# Patient Record
Sex: FEMALE | Race: WHITE
[De-identification: names, ages, dates, MRNs, and addresses within clinical notes are randomized per-mention and may not be internally consistent; named-entity substitution may affect disease eponyms.]

---

## 2017-01-18 ENCOUNTER — HOSPITAL ENCOUNTER (EMERGENCY)
Dept: HOSPITAL 62 - ER | Age: 27
Discharge: HOME | End: 2017-01-18
Payer: OTHER GOVERNMENT

## 2017-01-18 VITALS — DIASTOLIC BLOOD PRESSURE: 78 MMHG | SYSTOLIC BLOOD PRESSURE: 121 MMHG

## 2017-01-18 DIAGNOSIS — R20.0: ICD-10-CM

## 2017-01-18 DIAGNOSIS — G51.0: Primary | ICD-10-CM

## 2017-01-18 DIAGNOSIS — R29.810: ICD-10-CM

## 2017-01-18 PROCEDURE — 70450 CT HEAD/BRAIN W/O DYE: CPT

## 2017-01-18 PROCEDURE — 99284 EMERGENCY DEPT VISIT MOD MDM: CPT

## 2017-01-18 NOTE — ER DOCUMENT REPORT
ED Medical Screen (RME)





- General


Stated Complaint: RIGHT SIDE FACIAL NUMBNESS/DROOP


Mode of Arrival: Ambulatory


Information source: Patient


Notes: 


Patient presents to the emergency department with right-sided facial droop, 

numbness.  Patient recently had vaginal births 2 weeks ago.  She reports eye 

irritation started last night,, numbness two hours ago,  facial droop started 

one hour ago.  Denies shortness of breath chest pain.








I have greeted and performed a rapid initial assessment of this patient.  A 

comprehensive ED assessment and evaluation of the patient, analysis of test 

results and completion of the medical decision making process will be conducted 

by additional ED providers.


TRAVEL OUTSIDE OF THE [place] IN LAST 30 DAYS: No





- Related Data


Allergies/Adverse Reactions: 


 





latex [Latex] Allergy (Verified 01/18/17 18:19)


 











Past Medical History


Past Surgical History: Reports: Hx Orthopedic Surgery - r Knee





- Immunizations


Hx Diphtheria, Pertussis, Tetanus Vaccination: Yes

## 2017-01-18 NOTE — ER DOCUMENT REPORT
ED General





- General


Chief Complaint: Facial Droop


Stated Complaint: RIGHT SIDE FACIAL NUMBNESS/DROOP


Mode of Arrival: Ambulatory


Notes: 


Patient is a 26-year-old female without past medical history, 2 weeks 

postpartum from a spontaneous vaginal delivery who presents with 2 hours of left

-sided facial droop.  Patient's symptoms started abruptly and have been 

unchanged since onset.  Nothing improves or worsens her symptoms.  She denies a 

history of similar symptoms in the past.  Denies any additional weakness or 

numbness in any other portion of her body.  No changes in her vision.  Notes 

that her left eye is unable to close completely.  She is not spoke with her 

primary care physician regarding today's concerned.


TRAVEL OUTSIDE OF THE [place] IN LAST 30 DAYS: No





- Related Data


Allergies/Adverse Reactions: 


 





latex [Latex] Allergy (Verified 01/18/17 18:19)


 











Past Medical History





- General


Information source: Patient





- Social History


Smoking Status: Never Smoker


Chew tobacco use (# tins/day): No


Frequency of alcohol use: None


Drug Abuse: None


Lives with: Spouse/Significant other


Family History: Reviewed & Not Pertinent


Patient has suicidal ideation: No


Patient has homicidal ideation: No


Renal/ Medical History: Denies: Hx Peritoneal Dialysis


Past Surgical History: Reports: Hx Orthopedic Surgery - r Knee





- Immunizations


Hx Diphtheria, Pertussis, Tetanus Vaccination: Yes


Hx Pneumococcal Vaccination: 10/01/14





Review of Systems





- Review of Systems


Notes: 


Constitutional: Negative for fever.


HENT: Negative for sore throat.


Eyes: Negative for visual changes.


Cardiovascular: Negative for chest pain.


Respiratory: Negative for shortness of breath.


Gastrointestinal: Negative for abdominal pain, vomiting or diarrhea.


Genitourinary: Negative for dysuria.


Musculoskeletal: Negative for back pain.


Skin: Negative for rash.


Neurological: Negative for headaches, positive for left facial droop





10 point ROS negative except as marked above and in HPI.





Physical Exam





- Vital signs


Vitals: 


 











Temp Pulse Resp BP Pulse Ox


 


 99.6 F   92   16   129/83 H  99 


 


 01/18/17 18:23  01/18/17 18:23  01/18/17 18:23  01/18/17 18:23  01/18/17 18:23











Interpretation: Normal


Notes: 


PHYSICAL EXAMINATION:





GENERAL: Well-appearing, well-nourished and in no acute distress.





HEAD: Atraumatic, normocephalic.





EYES: Pupils equal round and reactive to light, extraocular movements intact, 

sclera anicteric, conjunctiva are normal.





ENT: nares patent, oropharynx clear without exudates.  Moist mucous membranes.





NECK: Normal range of motion, supple without lymphadenopathy





LUNGS: Breath sounds clear to auscultation bilaterally and equal.  No wheezes 

rales or rhonchi.





HEART: Regular rate and rhythm without murmurs





ABDOMEN: Soft, nontender, normoactive bowel sounds.  No guarding, no rebound.  

No masses appreciated.





EXTREMITIES: Normal range of motion, no pitting or edema.  No cyanosis.





NEUROLOGICAL: Slight left facial droop with effacement of the left forehead.  

Sensation intact bilaterally in the face.  Tongue protrudes midline.  

Extraocular motions intact.  Pupils are 2 mm and equally reactive.  Normal 

speech, normal gait.  5 out of 5 strength in both the distal and proximal upper 

and lower extremities bilaterally.  Sensation is grossly intact throughout.  

Finger to nose testing normal.  Pronator drift normal.





PSYCH: Normal mood, normal affect.





SKIN: Warm, Dry, normal turgor, no rashes or lesions noted.





Course





- Re-evaluation


Re-evalutation: 





01/19/17 03:14


Presentation is most consistent with a Bell's palsy on the left side.  Patient 

has complete involvement including of the forehead.  No additional focal 

neurologic deficits.  Presentation and exam are not consistent with an acute 

stroke.  Patient has been started on prednisone and valacyclovir.  An eye patch 

has been provided.  At this time will discharge with return precautions and 

follow-up recommendations.  Verbal discharge instructions given a the bedside 

and opportunity for questions given. Medication warnings reviewed. Patient is 

in agreement with this plan and has verbalized understanding of return 

precautions and the need for primary care follow-up in the next 24-72 hours.





- Vital Signs


Vital signs: 


 











Temp Pulse Resp BP Pulse Ox


 


 98.4 F   96   17   121/78   97 


 


 01/18/17 20:16  01/18/17 20:16  01/18/17 20:16  01/18/17 20:16  01/18/17 20:16














- Diagnostic Test


Radiology reviewed: Reports reviewed





Discharge





- Discharge


Clinical Impression: 


 Bell's palsy





Condition: Good


Disposition: HOME, SELF-CARE


Additional Instructions: 


You have been diagnosed with a condition called Bell's palsy.  You have not had 

a stroke.  This is inflammation of your facial nerve and should improve over 

the next several months.  Very few cases progress to being permanent.  Please 

take the steroids and antiviral medications that have been prescribed as 

directed.  Complete the course.  Please follow-up with your primary care 

physician in the next several days.  Return if you develop spreading weakness, 

numbness, confusion, headache, neck pain, fever greater than 101F, or any 

other symptoms that are concerning to you.


Prescriptions: 


Prednisone 40 mg PO DAILY #12 tablet


Valacyclovir HCl [Valacyclovir] 1,000 mg PO Q12H #14 tablet

## 2017-11-13 ENCOUNTER — HOSPITAL ENCOUNTER (OUTPATIENT)
Dept: HOSPITAL 62 - END | Age: 27
Discharge: HOME | End: 2017-11-13
Attending: INTERNAL MEDICINE
Payer: OTHER GOVERNMENT

## 2017-11-13 VITALS — DIASTOLIC BLOOD PRESSURE: 62 MMHG | SYSTOLIC BLOOD PRESSURE: 100 MMHG

## 2017-11-13 DIAGNOSIS — K64.8: ICD-10-CM

## 2017-11-13 DIAGNOSIS — K29.50: ICD-10-CM

## 2017-11-13 DIAGNOSIS — D12.4: Primary | ICD-10-CM

## 2017-11-13 DIAGNOSIS — Z80.0: ICD-10-CM

## 2017-11-13 DIAGNOSIS — K44.9: ICD-10-CM

## 2017-11-13 DIAGNOSIS — K92.1: ICD-10-CM

## 2017-11-13 PROCEDURE — 0DBM8ZX EXCISION OF DESCENDING COLON, VIA NATURAL OR ARTIFICIAL OPENING ENDOSCOPIC, DIAGNOSTIC: ICD-10-PCS | Performed by: INTERNAL MEDICINE

## 2017-11-13 PROCEDURE — 45380 COLONOSCOPY AND BIOPSY: CPT

## 2017-11-13 PROCEDURE — 88342 IMHCHEM/IMCYTCHM 1ST ANTB: CPT

## 2017-11-13 PROCEDURE — 43239 EGD BIOPSY SINGLE/MULTIPLE: CPT

## 2017-11-13 PROCEDURE — 0DB68ZX EXCISION OF STOMACH, VIA NATURAL OR ARTIFICIAL OPENING ENDOSCOPIC, DIAGNOSTIC: ICD-10-PCS | Performed by: INTERNAL MEDICINE

## 2017-11-13 PROCEDURE — 88305 TISSUE EXAM BY PATHOLOGIST: CPT

## 2017-11-13 NOTE — OPERATIVE REPORT
Operative Report


DATE OF SURGERY: 11/13/17


Operative Report: 





The risks, benefits and alternatives of the procedure including risks of 

bleeding, perforation requiring surgery are explained to the patient in detail 

and informed consent is obtained.  Patient was taken back to the endoscopy 

suite and placed in the left, lateral decubital position.  Timeout was called.  

Propofol medications administered.  A rectal examination was done which did not 

reveal any masses, tears or fissures.  An Olympus videoscope was inserted into 

the patient's rectum.  The scope was then carefully advanced all the way to the 

cecum.  The cecum was identified by the usual anatomical landmarks including 

the ileocecal valve as well as the appendiceal office.  Photodocumentation is 

obtained.  The scope was then sequentially pulled back via the various segments 

of the colon including the ascending colon, hepatic flexure, transverse colon, 

splenic flexure, descending colon and finding to the rectosigmoid portions of 

the colon.  Retroflexion maneuvers performed.  Intubation of the terminal ileum 

is done.


The risks benefits and alternatives of the procedure explained to the patient 

in detail and informed consent is obtained.A GIF Olympus video scope was 

inserted into the patient's mouth and hypopharynx, the esophagus is identified 

intubated and insufflated, the scope was then advanced through the esophagus 

stomach and duodenum, retroflexion maneuver is done ,the esophagus stomach and 

first and second portions of the duodenum examined


PREOPERATIVE DIAGNOSIS: Epigastric pain rule out Helicobacter pylori.  

Screening colonoscopy secondary to a family history of colorectal cancer


POSTOPERATIVE DIAGNOSIS: Small polyp was removed via biopsy forceps.  Internal 

hemorrhoids.  Gastritis status post biopsy rule out Helicobacter pylori.  Small 

hiatal hernia


OPERATION: Colonoscopy with biopsy.  EGD with biopsy


SURGEON: SHIMON LOPES


ANESTHESIA: LMAC


TISSUE REMOVED OR ALTERED: As noted above.


COMPLICATIONS: 





None.


ESTIMATED BLOOD LOSS: None.


INTRAOPERATIVE FINDINGS: As noted above.


PROCEDURE: 





Patient tolerated procedure well.


No immediate postprocedure complications are noted.


Patient discharged in good condition.


Discharge date 11/13/2017.


Discharge diet: Regular.


Discharge activity: Regular.


2-3 week follow-up to discuss findings.


Patient is instructed to call the office or proceed to the emergency room 

should there be any further problems or questions.


5 year surveillance colonoscopy.


We will await pathology.

## 2019-06-06 ENCOUNTER — HOSPITAL ENCOUNTER (EMERGENCY)
Dept: HOSPITAL 62 - ER | Age: 29
Discharge: HOME | End: 2019-06-06
Payer: OTHER GOVERNMENT

## 2019-06-06 VITALS — SYSTOLIC BLOOD PRESSURE: 133 MMHG | DIASTOLIC BLOOD PRESSURE: 78 MMHG

## 2019-06-06 DIAGNOSIS — R11.0: ICD-10-CM

## 2019-06-06 DIAGNOSIS — R42: ICD-10-CM

## 2019-06-06 DIAGNOSIS — R00.2: Primary | ICD-10-CM

## 2019-06-06 DIAGNOSIS — R07.9: ICD-10-CM

## 2019-06-06 DIAGNOSIS — Z91.040: ICD-10-CM

## 2019-06-06 LAB
ADD MANUAL DIFF: NO
ALBUMIN SERPL-MCNC: 4.9 G/DL (ref 3.5–5)
ALP SERPL-CCNC: 56 U/L (ref 38–126)
ALT SERPL-CCNC: 20 U/L (ref 9–52)
ANION GAP SERPL CALC-SCNC: 13 MMOL/L (ref 5–19)
APPEARANCE UR: CLEAR
APTT BLD: 26.9 SEC (ref 23.5–35.8)
APTT PPP: (no result) S
AST SERPL-CCNC: 23 U/L (ref 14–36)
BASOPHILS # BLD AUTO: 0.1 10^3/UL (ref 0–0.2)
BASOPHILS NFR BLD AUTO: 0.7 % (ref 0–2)
BILIRUB DIRECT SERPL-MCNC: 0.2 MG/DL (ref 0–0.4)
BILIRUB SERPL-MCNC: 0.5 MG/DL (ref 0.2–1.3)
BILIRUB UR QL STRIP: NEGATIVE
BUN SERPL-MCNC: 7 MG/DL (ref 7–20)
CALCIUM: 9.4 MG/DL (ref 8.4–10.2)
CHLORIDE SERPL-SCNC: 106 MMOL/L (ref 98–107)
CO2 SERPL-SCNC: 24 MMOL/L (ref 22–30)
EOSINOPHIL # BLD AUTO: 0.1 10^3/UL (ref 0–0.6)
EOSINOPHIL NFR BLD AUTO: 0.7 % (ref 0–6)
ERYTHROCYTE [DISTWIDTH] IN BLOOD BY AUTOMATED COUNT: 14 % (ref 11.5–14)
GLUCOSE SERPL-MCNC: 107 MG/DL (ref 75–110)
GLUCOSE UR STRIP-MCNC: NEGATIVE MG/DL
HCT VFR BLD CALC: 41.4 % (ref 36–47)
HGB BLD-MCNC: 13.7 G/DL (ref 12–15.5)
INR PPP: 1.02
KETONES UR STRIP-MCNC: NEGATIVE MG/DL
LYMPHOCYTES # BLD AUTO: 2.7 10^3/UL (ref 0.5–4.7)
LYMPHOCYTES NFR BLD AUTO: 28.4 % (ref 13–45)
MCH RBC QN AUTO: 26.9 PG (ref 27–33.4)
MCHC RBC AUTO-ENTMCNC: 33.1 G/DL (ref 32–36)
MCV RBC AUTO: 81 FL (ref 80–97)
MONOCYTES # BLD AUTO: 0.5 10^3/UL (ref 0.1–1.4)
MONOCYTES NFR BLD AUTO: 5.6 % (ref 3–13)
NEUTROPHILS # BLD AUTO: 6.1 10^3/UL (ref 1.7–8.2)
NEUTS SEG NFR BLD AUTO: 64.6 % (ref 42–78)
NITRITE UR QL STRIP: NEGATIVE
PH UR STRIP: 8 [PH] (ref 5–9)
PLATELET # BLD: 206 10^3/UL (ref 150–450)
POTASSIUM SERPL-SCNC: 3.8 MMOL/L (ref 3.6–5)
PROT SERPL-MCNC: 8.1 G/DL (ref 6.3–8.2)
PROT UR STRIP-MCNC: NEGATIVE MG/DL
PROTHROMBIN TIME: 13.9 SEC (ref 11.4–15.4)
RBC # BLD AUTO: 5.09 10^6/UL (ref 3.72–5.28)
SODIUM SERPL-SCNC: 142.9 MMOL/L (ref 137–145)
SP GR UR STRIP: 1.01
TOTAL CELLS COUNTED % (AUTO): 100 %
UROBILINOGEN UR-MCNC: NEGATIVE MG/DL (ref ?–2)
WBC # BLD AUTO: 9.4 10^3/UL (ref 4–10.5)

## 2019-06-06 PROCEDURE — 85610 PROTHROMBIN TIME: CPT

## 2019-06-06 PROCEDURE — 85730 THROMBOPLASTIN TIME PARTIAL: CPT

## 2019-06-06 PROCEDURE — 93010 ELECTROCARDIOGRAM REPORT: CPT

## 2019-06-06 PROCEDURE — S0119 ONDANSETRON 4 MG: HCPCS

## 2019-06-06 PROCEDURE — 93005 ELECTROCARDIOGRAM TRACING: CPT

## 2019-06-06 PROCEDURE — 71045 X-RAY EXAM CHEST 1 VIEW: CPT

## 2019-06-06 PROCEDURE — 85025 COMPLETE CBC W/AUTO DIFF WBC: CPT

## 2019-06-06 PROCEDURE — 36415 COLL VENOUS BLD VENIPUNCTURE: CPT

## 2019-06-06 PROCEDURE — 99285 EMERGENCY DEPT VISIT HI MDM: CPT

## 2019-06-06 PROCEDURE — 81001 URINALYSIS AUTO W/SCOPE: CPT

## 2019-06-06 PROCEDURE — 87086 URINE CULTURE/COLONY COUNT: CPT

## 2019-06-06 PROCEDURE — 80053 COMPREHEN METABOLIC PANEL: CPT

## 2019-06-06 PROCEDURE — 84484 ASSAY OF TROPONIN QUANT: CPT

## 2019-06-06 PROCEDURE — 84703 CHORIONIC GONADOTROPIN ASSAY: CPT

## 2019-06-06 NOTE — RADIOLOGY REPORT (SQ)
EXAM DESCRIPTION:  CHEST SINGLE VIEW



COMPLETED DATE/TIME:  6/6/2019 2:05 pm



REASON FOR STUDY:  chest pain



COMPARISON:  None.



EXAM PARAMETERS:  NUMBER OF VIEWS: One view.

TECHNIQUE: Single frontal radiographic view of the chest acquired.

RADIATION DOSE: NA

LIMITATIONS: None.



FINDINGS:  LUNGS AND PLEURA: No opacities, masses or pneumothorax. No pleural effusion.

MEDIASTINUM AND HILAR STRUCTURES: No masses.  Contour normal.

HEART AND VASCULAR STRUCTURES: Heart normal in size.  Normal vasculature.

BONES: No acute findings.

HARDWARE: None in the chest.

OTHER: No other significant finding.



IMPRESSION:  NO ACUTE RADIOGRAPHIC FINDING IN THE CHEST.



TECHNICAL DOCUMENTATION:  JOB ID:  [id number]

 2011 [employer]- All Rights Reserved



Reading location - IP/workstation name: [email or web address]

## 2019-06-06 NOTE — ER DOCUMENT REPORT
ED Cardiac





- General


Chief Complaint: Palpitations


Stated Complaint: HEART FLUTTERING


Time Seen by Provider: 06/06/19 13:39


Primary Care Provider: 


ZONIA PHOENIX MD [Primary Care Provider] - Follow up tomorrow


Mode of Arrival: Ambulatory


Information source: Patient


Notes: 





28-year-old female presents to ED for complaint of chest palpitations fluttering

shaky blurry vision and cold sweat.  She states she has a heaviness in her chest

and is hard to take a deep breath.  She states she does have a history of 

anxiety panic attacks and depression.  Is on medications for these as well as 

for gastritis.  She states she also has some thrush and is on Diflucan that she 

started a couple days ago and she has decided to stop taking the Diflucan.  She 

states that first she was taken nystatin and her urgent care doctor put her on 

Diflucan.  She states she has had symptoms such as this in the past and was put 

on a Holter monitor and they did not find any critical findings.  She states she

has had increased bruising for some reason lately.  She also states she monitors

her pulse with her watch and says that her pulse is been up as high as 120 

today.  She was walking outside from one building to another.  Labs x-ray and 

EKG were all completed before I examined the patient everything was within 

normal limits.  Monitor does not show any irregularity in her heartbeat and it 

is not tachycardic.


TRAVEL OUTSIDE OF THE [place] IN LAST 30 DAYS: No





- HPI


Patient complains to provider of: Chest pain, Palpitations, Shortness of breath


Is the pain a: Chronic problem


Chest pain location: Substernal


Quality of pain: Intermittent, Heaviness, Indigestion, Tightness


Severity now: None


Pain level currently: Denies


Chest pain precipitating factors: Working


Cardiac risk factors: None


Positive cardiac history: No


Associated symptoms: Anxiety, Dizziness, Heartburn, Lightheaded, Nausea/vo

miting, Palpitations, Shortness of breath


Exacerbated by: Denies


Relieved by: Nothing


Similar symptoms previously: Yes


Recently seen / treated by doctor: Yes





- Related Data


Allergies/Adverse Reactions: 


                                        





latex [Latex] Allergy (Verified 06/06/19 13:13)


   Hives











Past Medical History





- General


Information source: Patient





- Social History


Smoking Status: Never Smoker


Frequency of alcohol use: Occasional


Drug Abuse: None


Occupation: Registered nurse


Lives with: Family


Family History: Reviewed & Not Pertinent


Patient has suicidal ideation: No


Patient has homicidal ideation: No





- Past Medical History


Cardiac Medical History: Reports: None, Other - Has had palpitations before


Pulmonary Medical History: Reports: None


EENT Medical History: Reports: None


Neurological Medical History: Reports: None


Endocrine Medical History: Reports: None


Renal/ Medical History: Reports: None


Malignancy Medical History: Reports: None


GI Medical History: Reports: Hx Gastritis


Musculoskeletal Medical History: Reports Hx Musculoskeletal Deformity, Reports 

Hx Musculoskeletal Trauma


Skin Medical History: Reports None


Psychiatric Medical History: Reports: Hx Anxiety - Panic attacks, Hx Depression


Traumatic Medical History: Reports: None


Infectious Medical History: Reports: None


Past Surgical History: Reports: Hx Orthopedic Surgery - r Knee





- Immunizations


Hx Diphtheria, Pertussis, Tetanus Vaccination: Yes


Hx Pneumococcal Vaccination: 10/01/14





Review of Systems





- Review of Systems


Constitutional: No symptoms reported


EENT: No symptoms reported


Cardiovascular: Chest pain, Palpitations, Dizziness, Lightheaded


Respiratory: Short of breath


Gastrointestinal: Nausea


Genitourinary: No symptoms reported


Female Genitourinary: No symptoms reported


Musculoskeletal: No symptoms reported


Skin: No symptoms reported


Hematologic/Lymphatic: No symptoms reported


Neurological/Psychological: No symptoms reported


-: Yes All other systems reviewed and negative





Physical Exam





- Vital signs


Vitals: 


                                        











Temp Pulse Resp BP Pulse Ox


 


 98.1 F   82   18   153/72 H  100 


 


 06/06/19 13:12  06/06/19 13:12  06/06/19 13:12  06/06/19 13:12  06/06/19 13:12











Interpretation: Normal





- General


General appearance: Appears well, Alert





- HEENT


Head: Normocephalic, Atraumatic


Eyes: Normal


Pupils: PERRL





- Respiratory


Respiratory status: No respiratory distress


Chest status: Nontender


Breath sounds: Normal


Chest palpation: Normal





- Cardiovascular


Rhythm: Regular


Heart sounds: Normal auscultation


Murmur: No





- Abdominal


Inspection: Normal


Distension: No distension


Bowel sounds: Normal


Tenderness: Nontender


Organomegaly: No organomegaly





- Back


Back: Normal, Nontender





- Extremities


General upper extremity: Normal inspection, Nontender, Normal color, Normal ROM,

 Normal temperature


General lower extremity: Normal inspection, Nontender, Normal color, Normal ROM,

 Normal temperature, Normal weight bearing.  No: Andrew's sign





- Neurological


Neuro grossly intact: Yes


Cognition: Normal


Orientation: AAOx4


Smith[place] Coma Scale Eye Opening: Spontaneous


Smith[place] Coma Scale Verbal: Oriented


[place] Coma Scale Motor: Obeys Commands


[place] Coma Scale Total: 15


Speech: Normal


Motor strength normal: LUE, RUE, LLE, RLE


Sensory: Normal





- Psychological


Associated symptoms: Normal affect, Normal mood





- Skin


Skin Temperature: Warm


Skin Moisture: Dry


Skin Color: Normal





Course





- Re-evaluation


Re-evalutation: 





06/06/19 16:24


Discussed labs and x-ray with patient and with Dr. Fairbanks.  He recommended 

discharging patient with close follow-up with primary doctor.  Patient has been 

medicated with Zofran and will be discharged home with prescription for Zofran. 

 Patient was discharged home after patient was able to verbalize understanding 

and agreement with treatment plan.





- Vital Signs


Vital signs: 


                                        











Temp Pulse Resp BP Pulse Ox


 


 97.5 F   82   18   133/78 H  100 


 


 06/06/19 16:42  06/06/19 13:12  06/06/19 16:42  06/06/19 16:42  06/06/19 16:42














- Laboratory


Result Diagrams: 


                                 06/06/19 13:56





                                 06/06/19 13:56


Laboratory results interpreted by me: 


                                        











  06/06/19 06/06/19





  13:56 15:58


 


MCH  26.9 L 


 


Ur Leukocyte Esterase   MODERATE H














- Diagnostic Test


Radiology reviewed: Image reviewed, Reports reviewed





- EKG Interpretation by Me


EKG shows normal: Sinus rhythm, Axis, Intervals, QRS Complexes - The provider 

there is a 29-year-old patient





Discharge





- Discharge


Clinical Impression: 


 Palpitation, History of indigestion





Chest pain


Qualifiers:


 Chest pain type: unspecified Qualified Code(s): R07.9 - Chest pain, unspecified





Disposition: HOME, SELF-CARE


Additional Instructions: 


 CHEST PAIN OF UNCLEAR CAUSE:





     The exact cause of your chest pain isn't clear.  Fortunately, there is no 

evidence of a dangerous medical condition.  Further testing may be required to 

find the source of the pain.


     Most often, we find that this pain is coming from the chest wall -- the 

muscles or rib joints in the chest.  But chest pain can come from the lung and 

lung lining, the esophagus, the heart valves or heart lining, and even the stom

ach or gallbladder.


     Rest.  Eat lightly until the pain is gone.  We may prescribe medicine for p

ain and inflammation.


     You should call the physician immediately if the pain radiates to the sydnie

ulder, jaw or arms; if you start to run a fever or develop a cough; or if you 

develop shortness of breath, or other new or alarming symptoms.








NORMAL EXAM AND WORKUP:


     At this time, your examination and workup show no significant abnormality. 

 No significant abnormal physical findings were noted.  All laboratory, EKG, and

 imaging (x-ray, CT scans, ultrasound) studies that were ordered show no 

significant abnormality.


     Although your examination and all studies that were ordered showed no 

significant abnormal finding, there are no examinations and no studies that are 

100% accurate.  There is always the possibility that some abnormality could 

exist and not be detected with physical examination or within the limits and 

capabilities of laboratory and other studies.


     You should return or follow up as you were instructed on your visit today 

for further evaluation if your symptoms do not resolve.








CHEST WALL PAIN:


     Your chest pain may be coming from the chest wall. This is often caused by 

straining the muscles or joints in the chest during physical activity, direct 

trauma, coughing, or vigorous vomiting.  Persons with arthritis are especially 

prone to this type of pain, due to inflammation of the cartilage joints near the

 breast bone. Occasionally, no cause can be found.


     Rest from strenuous physical activity.  This kind of chest pain is usually 

made worse by movement of the chest.  Depending on the symptoms, we may 

prescribe medicine for pain, muscle relaxation, and antiinflammatory effects.


     If the pain is new, and seems to be due to muscle strain, cold packs can 

help.  Otherwise, apply gentle warmth to the painful area for 15 minutes every 

hour or two.


     You should call contact the doctor immediately if things change. Further 

evaluation is needed if you develop a fever or cough, if the nature of the pain 

changes, or if you become short of breath.








ACID REFLUX DISEASE (GERD):


     Gastro-Esophageal Reflux Disease (GERD) is caused by stomach acid refluxing

 back up into the esophagus. The valve at the end of the esophagus may be weak. 

This is common in persons with a hiatal hernia. GERD symptoms can include 

indigestion, chest pain, heartburn, or food "sticking." Certain foods, alcohol, 

and aspirin can make GERD worse.


     Treatment depends on the severity. Usually, antacids or acid-suppressing 

medicines are used. When the esophagus is acutely inflamed, the physician will 

often prescribe membrane-protective drugs such as Carafate.  Some patients 

benefit from medication such as Reglan that tightens the valve at the top of the

 stomach.


     Avoid those foods that bring on your symptoms. For many people, these foods

 are coffee, chocolate, onions, garlic, and carbonated drinks. Don't use 

alcohol, aspirin, caffeine, or tobacco. Don't eat late at night -- within 4 

hours of bedtime. Don't over-eat. If necessary, elevate the head of your bed 

about 4 inches so that stomach acid will not roll up into your esophagus.


     Call the doctor if you develop severe chest pain, inability to swallow 

fluids, fever, or worsening symptoms.








ANTACID THERAPY:


     You have been instructed to start antacid therapy.  Antacids directly 

neutralize stomach acid.  This is useful for acid irritation of the esophagus, 

gastritis, and ulcers.


     You should take two tablespoons of antacid one hour after each meal and 

three hours after each meal.  If you are not eating, take the antacid every two 

hours.  If you are using a concentrate (such as Maalox TC), use only one 

tablespoon.


     Many antacids affect the bowels.  The most common problem is diarrhea.  In 

this case, a pure aluminum hydroxide antacid (such as AlternaGel) can be 

substituted for some or all doses.  If the problem is constipation, add a 

teaspoon of Milk of Magnesia to each dose.


     Call the doctor if you experience continued diarrhea or constipation, or if

 you develop lightheadedness, bloody stool or vomitus, severe abdominal pain, or

 black stool.








FOLLOW-UP CARE:


If you have been referred to a physician for follow-up care, call the 

physicians office for an appointment as you were instructed or within the next 

two days.  If you experience worsening or a significant change in your symptoms,

 notify the physician immediately or return to the Emergency Department at any 

time for re-evaluation.





Please take your copy of your lab results and x-ray results to your primary care

 doctor for follow-up.


Prescriptions: 


Ondansetron [Zofran Odt 4 mg Tablet] 1 tab PO Q6H #15 tab.rapdis


Forms:  Return to Work


Referrals: 


ZONIA PHOENIX MD [Primary Care Provider] - Follow up tomorrow

## 2019-06-06 NOTE — ER DOCUMENT REPORT
ED Medical Screen (RME)





- General


Chief Complaint: Palpitations


Stated Complaint: HEART FLUTTERING


Time Seen by Provider: 06/06/19 13:39


Mode of Arrival: Ambulatory


Information source: Patient


Notes: 





Patient is otherwise healthy 28-year-old female presented emergency department 

with chief complaint of heart fluttering, shaking, blurry vision and cold 

sweats.  Patient also reports a heaviness on her chest and states it is hard to 

take a deep breath.  She does have a history of anxiety but states this feels 

different.  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 

She denies any history of cardiac disease, states she has had to have a Holter 

monitor on several occasions but never had any critical findings.  Patient also 

reports increase in bruising lately.  She does have several bruises on her left 

calf and her right arm.





Exam:


Patient mildly anxious.


Heart sounds S1-S2 present with no ectopy noted.


Lung sounds clear and equal bilaterally.





I have greeted and performed a rapid initial assessment of this patient.  A 

comprehensive ED assessment and evaluation of the patient, analysis of test 

results and completion of the medical decision making process will be conducted 

by additional ED providers.  Dictation of this chart was performed using voice 

recognition software; therefore, there may be some unintended grammatical 

errors.


TRAVEL OUTSIDE OF THE [place] IN LAST 30 DAYS: No





- Related Data


Allergies/Adverse Reactions: 


                                        





latex [Latex] Allergy (Verified 06/06/19 13:13)


   Hives











Past Medical History





- Past Medical History


Cardiac Medical History: 


   Denies: Hx Coronary Artery Disease, Hx Heart Attack, Hx Hypertension


Pulmonary Medical History: 


   Denies: Hx Asthma, Hx Bronchitis, Hx COPD, Hx Pneumonia


Neurological Medical History: Denies: Hx Cerebrovascular Accident, Hx Seizures


Renal/ Medical History: Denies: Hx Peritoneal Dialysis


Musculoskeltal Medical History: Denies Hx Arthritis


Past Surgical History: Reports: Hx Orthopedic Surgery - r Knee





- Immunizations


Hx Diphtheria, Pertussis, Tetanus Vaccination: Yes


Influenza Administration Date for 10/2017 - 3/2018 Season: 10/01/17





Physical Exam





- Vital signs


Vitals: 





                                        











Temp Pulse Resp BP Pulse Ox


 


 98.1 F   82   18   153/72 H  100 


 


 06/06/19 13:12  06/06/19 13:12  06/06/19 13:12  06/06/19 13:12  06/06/19 13:12














Course





- Vital Signs


Vital signs: 





                                        











Temp Pulse Resp BP Pulse Ox


 


 98.1 F   82   18   153/72 H  100 


 


 06/06/19 13:12  06/06/19 13:12  06/06/19 13:12  06/06/19 13:12  06/06/19 13:12

## 2019-06-06 NOTE — EKG REPORT
SEVERITY:- ABNORMAL ECG -

SINUS RHYTHM

PROBABLE LEFT ATRIAL ABNORMALITY

NONSPECIFIC T ABNORMALITIES, INFERIOR LEADS

:

Confirmed by: Jun Atkinson MD 06-Jun-2019 16:54:00